# Patient Record
Sex: MALE | Race: WHITE | NOT HISPANIC OR LATINO | URBAN - METROPOLITAN AREA
[De-identification: names, ages, dates, MRNs, and addresses within clinical notes are randomized per-mention and may not be internally consistent; named-entity substitution may affect disease eponyms.]

---

## 2025-05-22 ENCOUNTER — OFFICE VISIT (OUTPATIENT)
Dept: URGENT CARE | Age: 35
End: 2025-05-22
Payer: COMMERCIAL

## 2025-05-22 VITALS
RESPIRATION RATE: 18 BRPM | WEIGHT: 165 LBS | SYSTOLIC BLOOD PRESSURE: 182 MMHG | BODY MASS INDEX: 25.9 KG/M2 | HEIGHT: 67 IN | DIASTOLIC BLOOD PRESSURE: 113 MMHG | TEMPERATURE: 97.6 F | OXYGEN SATURATION: 99 % | HEART RATE: 95 BPM

## 2025-05-22 DIAGNOSIS — R03.0 ELEVATED BLOOD PRESSURE READING: ICD-10-CM

## 2025-05-22 DIAGNOSIS — Z11.3 SCREENING EXAMINATION FOR SEXUALLY TRANSMITTED DISEASE: Primary | ICD-10-CM

## 2025-05-22 PROCEDURE — 3008F BODY MASS INDEX DOCD: CPT | Performed by: PHYSICIAN ASSISTANT

## 2025-05-22 PROCEDURE — 1036F TOBACCO NON-USER: CPT | Performed by: PHYSICIAN ASSISTANT

## 2025-05-22 PROCEDURE — 99213 OFFICE O/P EST LOW 20 MIN: CPT | Performed by: PHYSICIAN ASSISTANT

## 2025-05-22 RX ORDER — DEXTROAMPHETAMINE SACCHARATE, AMPHETAMINE ASPARTATE, DEXTROAMPHETAMINE SULFATE AND AMPHETAMINE SULFATE 5; 5; 5; 5 MG/1; MG/1; MG/1; MG/1
1 TABLET ORAL 2 TIMES DAILY
COMMUNITY
Start: 2025-05-15

## 2025-05-22 ASSESSMENT — PATIENT HEALTH QUESTIONNAIRE - PHQ9
1. LITTLE INTEREST OR PLEASURE IN DOING THINGS: NOT AT ALL
2. FEELING DOWN, DEPRESSED OR HOPELESS: NOT AT ALL
SUM OF ALL RESPONSES TO PHQ9 QUESTIONS 1 AND 2: 0

## 2025-05-22 NOTE — PROGRESS NOTES
"Subjective   Patient ID: Levi Julian is a 35 y.o. male. They present today with a chief complaint of Other (Requesting STD testing, no symptoms).    Patient disposition: Home    HISTORY OF PRESENT ILLNESS:    [facility] adult presents for STI testing. He has no symptoms and denies dysuria, discharge, lesions. He wants to be tested to show his wife, at her request. Denies high risk sexual behaviors.    He has been checked for HTN including last month at internal medicine but has never had a dx of HTN. Denies HA, CP, dizziness, SOB, vision changes, strokelike sx.    Past Medical History  Allergies as of 05/22/2025    (No Known Allergies)       Prescriptions Prior to Admission[1]     Medical History[2]    Surgical History[3]     reports that he has never smoked. He has never used smokeless tobacco.    Review of Systems    Negative except as documented in the History of Present Illness.                             Objective    Vitals:    05/22/25 1743   BP: (!) 182/113   BP Location: Left arm   Patient Position: Sitting   Pulse: 95   Resp: 18   Temp: 36.4 °C (97.6 °F)   SpO2: 99%   Weight: 74.8 kg (165 lb)   Height: 1.702 m (5' 7\")     No LMP for male patient.      PHYSICAL EXAMINATION:    CONSTITUTIONAL: well-appearing, nontoxic    EYES:   No scleral icterus or orbital trauma noted. No conjunctival injection.     ENT:  Head and face are unremarkable and atraumatic. Mucous membranes moist.     LUNGS:  No respiratory distress noted. No coughing noted.    CARDIOVASCULAR:   Well-perfused.    ABDOMEN:  Nonobese, nondistended     MUSCULOSKELETAL: NOLEN with equal strength. Gait [observed to be normal.]    SKIN:   Good color, with no significant rashes, pallor, cyanosis, wounds.    NEURO:  Normal baseline mental status. No obvious neurological deficits, normal sensation and strength bilaterally.    PSYCH: Appropriate mood and affect.         ---------------------------------------------------------------         MDM:  STI testing " sent at patient's request. He will access results on [facility] and we will fu with him for any positive results.    In re elevated BP today, asymptomatic with history of elevated readings situationally without HTN dx. Will fu again with internal medicine for this, or at ED if any new sx develop.          Procedures    Diagnostic study results (if any) were reviewed by Mayank Johnson PA-C.    No results found for this visit on 05/22/25.     Assessment/Plan   Allergies, medications, history, and pertinent labs/EKGs/Imaging reviewed by Mayank Johnson PA-C.     Orders and Diagnoses  Diagnoses and all orders for this visit:  Screening examination for sexually transmitted disease  -     C. trachomatis / N. gonorrhoeae, Amplified, Urogenital  -     Trichomonas vaginalis, Amplified      Medical Admin Record      Follow Up Instructions  No follow-ups on file.    Electronically signed by Mayank Johnson PA-C  6:17 PM         [1] (Not in a hospital admission)  [2] No past medical history on file.  [3]   Past Surgical History:  Procedure Laterality Date    TONSILLECTOMY  08/15/2016    Tonsillectomy

## 2025-05-23 LAB
C TRACH RRNA SPEC QL NAA+PROBE: NOT DETECTED
N GONORRHOEA RRNA SPEC QL NAA+PROBE: NOT DETECTED
QUEST GC CT AMPLIFIED (ALWAYS MESSAGE): NORMAL
T VAGINALIS RRNA SPEC QL NAA+PROBE: NOT DETECTED